# Patient Record
Sex: MALE | Race: WHITE | NOT HISPANIC OR LATINO | URBAN - METROPOLITAN AREA
[De-identification: names, ages, dates, MRNs, and addresses within clinical notes are randomized per-mention and may not be internally consistent; named-entity substitution may affect disease eponyms.]

---

## 2022-12-06 ENCOUNTER — APPOINTMENT (RX ONLY)
Dept: URBAN - METROPOLITAN AREA CLINIC 151 | Facility: CLINIC | Age: 87
Setting detail: DERMATOLOGY
End: 2022-12-06

## 2022-12-06 DIAGNOSIS — L57.8 OTHER SKIN CHANGES DUE TO CHRONIC EXPOSURE TO NONIONIZING RADIATION: ICD-10-CM

## 2022-12-06 DIAGNOSIS — Z71.89 OTHER SPECIFIED COUNSELING: ICD-10-CM

## 2022-12-06 DIAGNOSIS — L81.4 OTHER MELANIN HYPERPIGMENTATION: ICD-10-CM

## 2022-12-06 PROBLEM — C44.42 SQUAMOUS CELL CARCINOMA OF SKIN OF SCALP AND NECK: Status: ACTIVE | Noted: 2022-12-06

## 2022-12-06 PROCEDURE — ? COUNSELING

## 2022-12-06 PROCEDURE — ? FULL BODY SKIN EXAM - DECLINED

## 2022-12-06 PROCEDURE — ? REVIEW OF OUTSIDE PATHOLOGY REPORTS

## 2022-12-06 PROCEDURE — ? OTHER

## 2022-12-06 PROCEDURE — ? REFERRAL

## 2022-12-06 PROCEDURE — 99203 OFFICE O/P NEW LOW 30 MIN: CPT

## 2022-12-06 ASSESSMENT — LOCATION SIMPLE DESCRIPTION DERM
LOCATION SIMPLE: RIGHT FOREHEAD
LOCATION SIMPLE: LEFT CHEEK
LOCATION SIMPLE: RIGHT CHEEK

## 2022-12-06 ASSESSMENT — LOCATION DETAILED DESCRIPTION DERM
LOCATION DETAILED: RIGHT SUPERIOR FOREHEAD
LOCATION DETAILED: RIGHT MEDIAL FOREHEAD
LOCATION DETAILED: LEFT CENTRAL MALAR CHEEK
LOCATION DETAILED: RIGHT LATERAL MALAR CHEEK

## 2022-12-06 ASSESSMENT — LOCATION ZONE DERM: LOCATION ZONE: FACE

## 2022-12-06 NOTE — PROCEDURE: REVIEW OF OUTSIDE PATHOLOGY REPORTS
Detail Level: Zone
Summary Of Outside Pathology: Patient had SCC removed. Lesion size today is 1.5 inch x 1.5 inch.

## 2022-12-06 NOTE — PROCEDURE: OTHER
Other (Free Text): Patients granddaughter was present with him. They would like to treat enough to keep it from bleeding. They tell me that he had a lot of pain when suture was removed and it caused his blood pressure to rise and they feel that this caused a mini stroke. They are requesting possible sedation for surgery. Due to size of lesion and patient requests, we discussed a plastic surgery referral. Photos obtained today. Photos brought in by patient of post treatment. Biopsy report brought in by patient. This is likely a recurrence.
Render Risk Assessment In Note?: no
Detail Level: Zone
Note Text (......Xxx Chief Complaint.): This diagnosis correlates with the

## 2023-09-13 ENCOUNTER — APPOINTMENT (RX ONLY)
Dept: URBAN - METROPOLITAN AREA CLINIC 150 | Facility: CLINIC | Age: 88
Setting detail: DERMATOLOGY
End: 2023-09-13

## 2023-09-13 DIAGNOSIS — L81.4 OTHER MELANIN HYPERPIGMENTATION: ICD-10-CM

## 2023-09-13 PROBLEM — C44.622 SQUAMOUS CELL CARCINOMA OF SKIN OF RIGHT UPPER LIMB, INCLUDING SHOULDER: Status: ACTIVE | Noted: 2023-09-13

## 2023-09-13 PROBLEM — C44.222 SQUAMOUS CELL CARCINOMA OF SKIN OF RIGHT EAR AND EXTERNAL AURICULAR CANAL: Status: ACTIVE | Noted: 2023-09-13

## 2023-09-13 PROCEDURE — ? REFERRAL

## 2023-09-13 PROCEDURE — ? COUNSELING

## 2023-09-13 PROCEDURE — ? OTHER

## 2023-09-13 PROCEDURE — ? TREATMENT REGIMEN

## 2023-09-13 PROCEDURE — ? FULL BODY SKIN EXAM - DECLINED

## 2023-09-13 PROCEDURE — 99213 OFFICE O/P EST LOW 20 MIN: CPT

## 2023-09-13 PROCEDURE — ? DEFER

## 2023-09-13 NOTE — PROCEDURE: DEFER
X Size Of Lesion In Cm (Optional): 0
Introduction Text (Please End With A Colon): .
Detail Level: Detailed

## 2023-09-13 NOTE — PROCEDURE: OTHER
Other (Free Text): Patient states that he would like areas treated, but does not want to have areas \"cut.\"  Discussed that due to severity and sizes of lesions, I can offer a biopsy today, but that I cannot perform surgery. Discussed that due to his age, comorbidities, and size of lesions I would recommend referral to [facility].  Patient declines referral as it is \"too far.\" Discussed that SCC can be fatal, and stressed that I believe he would receive leading edge care should he consider [facility].  Also discussed that I can refer patient to plastic surgeon locally for treatment of lesions.  Patient states that he would be willing to see Dr. Marcial.  Will refer patient to plastic surgeon at this time. Other (Free Text): Patient states that he would like areas treated, but does not want to have areas \"cut.\"  Discussed that due to severity and sizes of lesions, I can offer a biopsy today, but that I cannot perform surgery. Discussed that due to his age, comorbidities, and size of lesions I would recommend referral to [facility].  Patient declines referral as it is \"too far.\" Discussed that SCC can be fatal, and stressed that I believe he would receive leading edge care should he consider [facility].  Also discussed that I can refer patient to plastic surgeon locally for treatment of lesions.  Patient states that he would be willing to see Dr. Marcial.  Will refer patient to plastic surgeon at this time.